# Patient Record
Sex: FEMALE | Race: BLACK OR AFRICAN AMERICAN | NOT HISPANIC OR LATINO | URBAN - METROPOLITAN AREA
[De-identification: names, ages, dates, MRNs, and addresses within clinical notes are randomized per-mention and may not be internally consistent; named-entity substitution may affect disease eponyms.]

---

## 2020-08-19 ENCOUNTER — OFFICE VISIT (OUTPATIENT)
Dept: URBAN - METROPOLITAN AREA CLINIC 27 | Facility: CLINIC | Age: 60
End: 2020-08-19

## 2020-09-23 ENCOUNTER — OFFICE VISIT (OUTPATIENT)
Dept: URBAN - METROPOLITAN AREA CLINIC 27 | Facility: CLINIC | Age: 60
End: 2020-09-23

## 2020-09-23 PROBLEM — 238136002 MORBID OBESITY: Status: ACTIVE | Noted: 2020-09-23

## 2020-10-28 ENCOUNTER — OFFICE VISIT (OUTPATIENT)
Dept: URBAN - METROPOLITAN AREA SURGERY CENTER 7 | Facility: SURGERY CENTER | Age: 60
End: 2020-10-28

## 2020-12-01 ENCOUNTER — OFFICE VISIT (OUTPATIENT)
Dept: URBAN - METROPOLITAN AREA CLINIC 27 | Facility: CLINIC | Age: 60
End: 2020-12-01

## 2021-01-14 ENCOUNTER — OFFICE VISIT (OUTPATIENT)
Dept: URBAN - METROPOLITAN AREA MEDICAL CENTER 8 | Facility: MEDICAL CENTER | Age: 61
End: 2021-01-14

## 2021-02-17 ENCOUNTER — OFFICE VISIT (OUTPATIENT)
Dept: URBAN - METROPOLITAN AREA MEDICAL CENTER 8 | Facility: MEDICAL CENTER | Age: 61
End: 2021-02-17

## 2022-04-30 ENCOUNTER — TELEPHONE ENCOUNTER (OUTPATIENT)
Dept: URBAN - METROPOLITAN AREA CLINIC 121 | Facility: CLINIC | Age: 62
End: 2022-04-30

## 2022-05-01 ENCOUNTER — TELEPHONE ENCOUNTER (OUTPATIENT)
Dept: URBAN - METROPOLITAN AREA CLINIC 121 | Facility: CLINIC | Age: 62
End: 2022-05-01

## 2022-05-01 RX ORDER — LOSARTAN POTASSIUM 50 MG/1
QD TABLET, FILM COATED ORAL
Status: ACTIVE | COMMUNITY
Start: 2015-12-18

## 2022-05-01 RX ORDER — POTASSIUM CHLORIDE 1500 MG/1
QD TABLET, FILM COATED, EXTENDED RELEASE ORAL
Status: ACTIVE | COMMUNITY
Start: 2015-12-18

## 2022-05-01 RX ORDER — ALBUTEROL SULFATE 90 UG/1
INHALANT RESPIRATORY (INHALATION)
Status: ACTIVE | COMMUNITY
Start: 2020-09-23

## 2022-05-01 RX ORDER — FEXOFENADINE HCL 180 MG/1
TABLET ORAL
Status: ACTIVE | COMMUNITY
Start: 2020-09-23

## 2022-05-01 RX ORDER — SIMVASTATIN 40 MG/1
QD TABLET, FILM COATED ORAL
Status: ACTIVE | COMMUNITY
Start: 2015-12-18

## 2022-05-01 RX ORDER — FUROSEMIDE 80 MG/1
QD TABLET ORAL
Status: ACTIVE | COMMUNITY
Start: 2015-12-18

## 2022-05-01 RX ORDER — ERGOCALCIFEROL 1.25 MG/1
QOW CAPSULE ORAL
Status: ACTIVE | COMMUNITY
Start: 2015-12-18

## 2022-05-01 RX ORDER — METFORMIN HCL 1000 MG/1
BID TABLET ORAL
Status: ACTIVE | COMMUNITY
Start: 2015-12-18

## 2022-05-01 RX ORDER — TIOTROPIUM BROMIDE INHALATION SPRAY 1.56 UG/1
SPRAY, METERED RESPIRATORY (INHALATION)
Status: ACTIVE | COMMUNITY
Start: 2020-09-23

## 2022-05-01 RX ORDER — OMEPRAZOLE 40 MG/1
QD CAPSULE, DELAYED RELEASE ORAL
Status: ACTIVE | COMMUNITY
Start: 2015-12-18

## 2022-05-01 RX ORDER — CARVEDILOL 25 MG/1
BID TABLET, FILM COATED ORAL
Status: ACTIVE | COMMUNITY
Start: 2015-12-18

## 2022-05-01 RX ORDER — FEXOFENADINE HCL 180 MG/1
QD TABLET ORAL
Status: ACTIVE | COMMUNITY
Start: 2015-12-18

## 2022-05-01 RX ORDER — POTASSIUM CHLORIDE 750 MG/1
TABLET, FILM COATED, EXTENDED RELEASE ORAL
Status: ACTIVE | COMMUNITY
Start: 2020-09-23

## 2022-05-01 RX ORDER — RIFAXIMIN 550 MG/1
1 TABLET PO TID X 14 DAYS TABLET ORAL
Status: ACTIVE | COMMUNITY
Start: 2015-12-18

## 2022-05-01 RX ORDER — ACYCLOVIR 400 MG/1
TID TABLET ORAL
Status: ACTIVE | COMMUNITY
Start: 2015-12-18

## 2022-05-01 RX ORDER — FLUTICASONE FUROATE AND VILANTEROL TRIFENATATE 200; 25 UG/1; UG/1
POWDER RESPIRATORY (INHALATION)
Status: ACTIVE | COMMUNITY
Start: 2020-09-23

## 2024-02-29 ENCOUNTER — OV NP (OUTPATIENT)
Dept: URBAN - METROPOLITAN AREA CLINIC 27 | Facility: CLINIC | Age: 64
End: 2024-02-29

## 2024-02-29 ENCOUNTER — LAB (OUTPATIENT)
Dept: URBAN - METROPOLITAN AREA CLINIC 27 | Facility: CLINIC | Age: 64
End: 2024-02-29

## 2024-02-29 VITALS
DIASTOLIC BLOOD PRESSURE: 77 MMHG | WEIGHT: 293 LBS | RESPIRATION RATE: 64 BRPM | BODY MASS INDEX: 47.09 KG/M2 | SYSTOLIC BLOOD PRESSURE: 165 MMHG | HEIGHT: 66 IN

## 2024-02-29 RX ORDER — TIOTROPIUM BROMIDE INHALATION SPRAY 1.56 UG/1
SPRAY, METERED RESPIRATORY (INHALATION)
Status: ACTIVE | COMMUNITY
Start: 2020-09-23

## 2024-02-29 RX ORDER — RIFAXIMIN 550 MG/1
1 TABLET PO TID X 14 DAYS TABLET ORAL
Status: ACTIVE | COMMUNITY
Start: 2015-12-18

## 2024-02-29 RX ORDER — POTASSIUM CHLORIDE 750 MG/1
TABLET, FILM COATED, EXTENDED RELEASE ORAL
Status: ACTIVE | COMMUNITY
Start: 2020-09-23

## 2024-02-29 RX ORDER — FLUTICASONE FUROATE AND VILANTEROL TRIFENATATE 200; 25 UG/1; UG/1
POWDER RESPIRATORY (INHALATION)
Status: ACTIVE | COMMUNITY
Start: 2020-09-23

## 2024-02-29 RX ORDER — LOSARTAN POTASSIUM 50 MG/1
QD TABLET, FILM COATED ORAL
Status: ACTIVE | COMMUNITY
Start: 2015-12-18

## 2024-02-29 RX ORDER — POTASSIUM CHLORIDE 1500 MG/1
QD TABLET, FILM COATED, EXTENDED RELEASE ORAL
Status: ACTIVE | COMMUNITY
Start: 2015-12-18

## 2024-02-29 RX ORDER — ALBUTEROL SULFATE 90 UG/1
INHALANT RESPIRATORY (INHALATION)
Status: ACTIVE | COMMUNITY
Start: 2020-09-23

## 2024-02-29 RX ORDER — METFORMIN HCL 1000 MG/1
BID TABLET ORAL
Status: ACTIVE | COMMUNITY
Start: 2015-12-18

## 2024-02-29 RX ORDER — ACYCLOVIR 400 MG/1
TID TABLET ORAL
Status: ACTIVE | COMMUNITY
Start: 2015-12-18

## 2024-02-29 RX ORDER — FEXOFENADINE HCL 180 MG/1
TABLET ORAL
Status: ACTIVE | COMMUNITY
Start: 2020-09-23

## 2024-02-29 RX ORDER — CARVEDILOL 25 MG/1
BID TABLET, FILM COATED ORAL
Status: ACTIVE | COMMUNITY
Start: 2015-12-18

## 2024-02-29 RX ORDER — FUROSEMIDE 80 MG/1
QD TABLET ORAL
Status: ACTIVE | COMMUNITY
Start: 2015-12-18

## 2024-02-29 RX ORDER — SIMVASTATIN 40 MG/1
QD TABLET, FILM COATED ORAL
Status: ACTIVE | COMMUNITY
Start: 2015-12-18

## 2024-02-29 RX ORDER — OMEPRAZOLE 40 MG/1
QD CAPSULE, DELAYED RELEASE ORAL
Status: ACTIVE | COMMUNITY
Start: 2015-12-18

## 2024-02-29 RX ORDER — ERGOCALCIFEROL 1.25 MG/1
QOW CAPSULE ORAL
Status: ACTIVE | COMMUNITY
Start: 2015-12-18

## 2024-02-29 NOTE — HPI-TODAY'S VISIT:
This is a 63-year-old female seen in consultation today for her irritable bowel symptoms.  She is morbidly obese and mainly complains of gurgling in her stomach this has been ongoing for years.  She has alternating diarrhea and constipation but more diarrhea.  She has diabetes hypertension.  She takes metformin.  Her doctor also told her to take magnesium twice a day because her magnesium was low.  She takes Lasix and losartan omeprazole for reflux and Valtrex she is also undergoing a routine cardiac evaluation with stress test with Dr. Kline